# Patient Record
Sex: MALE | URBAN - METROPOLITAN AREA
[De-identification: names, ages, dates, MRNs, and addresses within clinical notes are randomized per-mention and may not be internally consistent; named-entity substitution may affect disease eponyms.]

---

## 2021-11-01 ENCOUNTER — TELEMEDICINE (OUTPATIENT)
Dept: BEHAVIORAL/MENTAL HEALTH CLINIC | Facility: CLINIC | Age: 26
End: 2021-11-01
Payer: COMMERCIAL

## 2021-11-01 DIAGNOSIS — F41.8 PERFORMANCE ANXIETY: Primary | ICD-10-CM

## 2021-11-01 PROCEDURE — 90791 PSYCH DIAGNOSTIC EVALUATION: CPT | Performed by: COUNSELOR

## 2021-11-12 ENCOUNTER — TELEMEDICINE (OUTPATIENT)
Dept: BEHAVIORAL/MENTAL HEALTH CLINIC | Facility: CLINIC | Age: 26
End: 2021-11-12
Payer: COMMERCIAL

## 2021-11-12 DIAGNOSIS — F41.8 PERFORMANCE ANXIETY: Primary | ICD-10-CM

## 2021-11-12 PROCEDURE — 90834 PSYTX W PT 45 MINUTES: CPT | Performed by: COUNSELOR

## 2021-11-30 ENCOUNTER — TELEMEDICINE (OUTPATIENT)
Dept: BEHAVIORAL/MENTAL HEALTH CLINIC | Facility: CLINIC | Age: 26
End: 2021-11-30
Payer: COMMERCIAL

## 2021-11-30 DIAGNOSIS — F41.8 PERFORMANCE ANXIETY: Primary | ICD-10-CM

## 2021-11-30 PROCEDURE — 90832 PSYTX W PT 30 MINUTES: CPT | Performed by: COUNSELOR

## 2022-01-26 ENCOUNTER — TELEMEDICINE (OUTPATIENT)
Dept: BEHAVIORAL/MENTAL HEALTH CLINIC | Facility: CLINIC | Age: 27
End: 2022-01-26
Payer: COMMERCIAL

## 2022-01-26 DIAGNOSIS — F41.8 PERFORMANCE ANXIETY: Primary | ICD-10-CM

## 2022-01-26 PROCEDURE — 90834 PSYTX W PT 45 MINUTES: CPT | Performed by: COUNSELOR

## 2022-01-26 NOTE — PSYCH
Virtual Regular Visit    Verification of patient location:    Patient is located in the following state in which I hold an active license [place]      Assessment/Plan:    Problem List Items Addressed This Visit        Other    RESOLVED: Performance anxiety - Primary          Goals addressed in session: Goal 1          Reason for visit is   Chief Complaint   Patient presents with    Virtual Regular Visit        Encounter provider [facility]    Provider located at [address] [place] [address] [id number] [phone]      Recent Visits  No visits were found meeting these conditions  Showing recent visits within past 7 days and meeting all other requirements  Today's Visits  Date Type Provider Dept   01/26/22 Telemedicine [facility] today's visits and meeting all other requirements  Future Appointments  No visits were found meeting these conditions  Showing future appointments within next 150 days and meeting all other requirements       The patient was identified by name and date of birth  Benedict Junior was informed that this is a telemedicine visit and that the visit is being conducted throughEpic Embedded and patient was informed this is a secure, HIPAA-complaint platform  He agrees to proceed     My office door was closed  No one else was in the room  He acknowledged consent and understanding of privacy and security of the video platform  The patient has agreed to participate and understands they can discontinue the visit at any time  Patient is aware this is a billable service  Subjective  Benedict Junior is a 32 y o  male    D: Had session with Raj Dickey - video was hard for him but we made it work  Did distress tolerance work at [employer] today  Processed thoughts and feelings about his relationship as they had another brief separation   Did work today on healthy communication  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   He reports that he is smoking less pot, gaining weight, eating more and letting things "roll" off his back more  We did work using role play with regards to issues with so  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 lock out ends and time away from so will be good for them both to do some thinking  Work done today around how to control things he can control and let go of the rest  Also, financial issues rt sig other and him feeling that she expects him to foot the bill for everything even though she makes $100,000  Processed this at length and I gave him some suggestions on how to find a good couples therapist as his so wants to go to his best friends friend  He says no  A: More assertive, gaining healthy weight, forward focused  P: See in 2 weeks; validate progress  CBT and DBT nex tsession       HPI     No past medical history on file  No past surgical history on file  No current outpatient medications on file  No current facility-administered medications for this visit  Not on File    Review of Systems    Video Exam    There were no vitals filed for this visit  Physical Exam     I spent 55 minutes directly with the patient during this visit    VIRTUAL VISIT [address] verbally agrees to participate in [facility]  Pt is aware that Virtual Care Services could be limited without vital signs or the ability to perform a full hands-on physical exam  Jarrod Deal understands he or the provider may request at any time to terminate the video visit and request the patient to seek care or treatment in person

## 2022-10-25 ENCOUNTER — DOCUMENTATION (OUTPATIENT)
Dept: PSYCHIATRY | Facility: CLINIC | Age: 27
End: 2022-10-25

## 2022-10-25 NOTE — PROGRESS NOTES
Assessment/Plan:   Anxiety (BUFFY)   There are no diagnoses linked to this encounter  Subjective:     Patient ID: Rachel Stoll is a 32 y o  male  Outpatient Discharge Summary: Mortimer Sabina did well in his treatment through OP therapy  - virtual only due to travel as   Admission Date: 11/1/21  Mortimer Sabina was referred by [address]   Discharge Date: 10-25-22    Discharge Diagnosis:    No diagnosis found  Treating Physician: None (Therapist - Izabela)  Treatment Complications: Mortimer Sabina travels a lot for work and was often not in the state of [place] or [place] in order to complete virtual sessions   Presenting Problem: Anxiety at work and relationship issues  Course of treatment includes:    individual case management, psychoeducation and individual therapy   Treatment Progress: good  Criteria for Discharge: completed treatment goals and objectives and is no longer in need of services  Aftercare recommendations include Mortimer Sabina would do well to continue psychotherapy however is in touch with company performance coaches for performance anxiety and will follow up with them  Discharge Medications include:No current outpatient medications on file      Prognosis: good